# Patient Record
Sex: FEMALE | Race: WHITE | NOT HISPANIC OR LATINO | Employment: FULL TIME | URBAN - NONMETROPOLITAN AREA
[De-identification: names, ages, dates, MRNs, and addresses within clinical notes are randomized per-mention and may not be internally consistent; named-entity substitution may affect disease eponyms.]

---

## 2018-02-09 ENCOUNTER — TELEPHONE (OUTPATIENT)
Dept: URGENT CARE | Facility: CLINIC | Age: 38
End: 2018-02-09

## 2018-02-09 DIAGNOSIS — B95.4 STREPTOCOCCAL INFECTION GROUP G: Primary | ICD-10-CM

## 2018-02-09 RX ORDER — AZITHROMYCIN 250 MG/1
TABLET, FILM COATED ORAL
Qty: 6 TABLET | Refills: 0 | Status: SHIPPED | OUTPATIENT
Start: 2018-02-09 | End: 2018-05-03

## 2020-04-06 ENCOUNTER — HOSPITAL ENCOUNTER (EMERGENCY)
Facility: HOSPITAL | Age: 40
Discharge: HOME | End: 2020-04-06
Attending: EMERGENCY MEDICINE | Admitting: EMERGENCY MEDICINE

## 2020-04-06 ENCOUNTER — APPOINTMENT (OUTPATIENT)
Dept: GENERAL RADIOLOGY | Facility: HOSPITAL | Age: 40
End: 2020-04-06

## 2020-04-06 VITALS
OXYGEN SATURATION: 95 % | DIASTOLIC BLOOD PRESSURE: 76 MMHG | HEIGHT: 62 IN | WEIGHT: 181 LBS | RESPIRATION RATE: 19 BRPM | SYSTOLIC BLOOD PRESSURE: 123 MMHG | HEART RATE: 74 BPM | BODY MASS INDEX: 33.31 KG/M2 | TEMPERATURE: 98.4 F
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
## 2020-04-06 PROCEDURE — 25010000003 LIDOCAINE 1 % SOLUTION: Performed by: PHYSICIAN ASSISTANT

## 2020-04-06 PROCEDURE — 99283 EMERGENCY DEPT VISIT LOW MDM: CPT

## 2020-04-06 PROCEDURE — 25010000002 TDAP 5-2.5-18.5 LF-MCG/0.5 SUSPENSION: Performed by: PHYSICIAN ASSISTANT

## 2020-04-06 PROCEDURE — 73090 X-RAY EXAM OF FOREARM: CPT

## 2020-04-06 PROCEDURE — 90715 TDAP VACCINE 7 YRS/> IM: CPT | Performed by: PHYSICIAN ASSISTANT

## 2020-04-06 PROCEDURE — 90471 IMMUNIZATION ADMIN: CPT | Performed by: PHYSICIAN ASSISTANT

## 2020-04-06 RX ORDER — DOXYCYCLINE 100 MG/1
100 CAPSULE ORAL 2 TIMES DAILY
Qty: 28 CAPSULE | Refills: 0 | Status: SHIPPED | OUTPATIENT
Start: 2020-04-06 | End: 2020-04-20

## 2020-04-06 RX ORDER — LIDOCAINE HYDROCHLORIDE 10 MG/ML
10 INJECTION, SOLUTION INFILTRATION; PERINEURAL ONCE
Status: COMPLETED | OUTPATIENT
Start: 2020-04-06 | End: 2020-04-06

## 2020-04-06 RX ADMIN — TETANUS TOXOID, REDUCED DIPHTHERIA TOXOID AND ACELLULAR PERTUSSIS VACCINE, ADSORBED 0.5 ML: 5; 2.5; 8; 8; 2.5 SUSPENSION INTRAMUSCULAR at 20:15

## 2020-04-06 RX ADMIN — LIDOCAINE HYDROCHLORIDE 10 ML: 10 INJECTION, SOLUTION INFILTRATION; PERINEURAL at 20:16

## 2020-04-06 NOTE — ED PROVIDER NOTES
"Subjective   This patient states just prior to arrival she was outdoors and neighbors were walking their dog and the dog bit her on the right volar forearm while she was petting it.  The dog's elderly although healthy-appearing.  Patient unsure when her last tetanus shot was but thinks it was longer than 5 years ago.  Bleeding controlled without any intervention upon arrival.  Patient has good strength in flexion of all digits of the right hand and right wrist against resistance without evidence of tendon injury.  She denies foreign body sensation.          Review of Systems   Constitutional: Negative.    HENT: Negative.    Eyes: Negative.    Respiratory: Negative.    Cardiovascular: Negative.    Gastrointestinal: Negative.    Genitourinary: Negative.    Musculoskeletal: Negative.    Skin:        Puncture wound to the right volar forearm/wrist   Neurological: Negative.    Psychiatric/Behavioral: Negative.        Past Medical History:   Diagnosis Date   • ADHD    • Disease of thyroid gland    • Insomnia    • Low blood sugar    • PCOS (polycystic ovarian syndrome)        Allergies   Allergen Reactions   • Sulfa Antibiotics Nausea Only and GI Intolerance   • Penicillins Rash     Patient states, taken as a child. Parent stated it gave a rash and turned her blue.\"   • Tamiflu [Oseltamivir Phosphate] Rash       Past Surgical History:   Procedure Laterality Date   • DENTAL PROCEDURE         Family History   Problem Relation Age of Onset   • No Known Problems Mother    • No Known Problems Father        Social History     Socioeconomic History   • Marital status:      Spouse name: Not on file   • Number of children: Not on file   • Years of education: Not on file   • Highest education level: Not on file   Tobacco Use   • Smoking status: Never Smoker   • Smokeless tobacco: Never Used           Objective   Physical Exam   Constitutional: She appears well-developed and well-nourished.   HENT:   Head: Normocephalic and " atraumatic.   Eyes: EOM are normal.   Neck: Normal range of motion.   Cardiovascular: Normal rate and regular rhythm.   Pulmonary/Chest: Effort normal.   Musculoskeletal: Normal range of motion.   Good strength in flexion against resistance of the DIPs of digits 2 through 5, PIPs of digits 2 through 5, MCPs of digits 2 through 5, and wrist.   Neurological: She is alert.   Skin: Skin is warm and dry.   2 cm puncture wound to the distal volar right forearm midline.  No visualized tendon or muscle belly.  No visualized foreign body.  No bleeding.  2+ radial pulse.   Psychiatric: She has a normal mood and affect. Her behavior is normal. Thought content normal.   Nursing note and vitals reviewed.      Laceration Repair  Date/Time: 4/6/2020 8:12 PM  Performed by: Garrett Amador PA-C  Authorized by: Link Cruz MD     Consent:     Consent obtained:  Verbal    Consent given by:  Patient    Risks discussed:  Infection and pain    Alternatives discussed:  No treatment  Anesthesia (see MAR for exact dosages):     Anesthesia method:  Local infiltration    Local anesthetic:  Lidocaine 1% w/o epi  Laceration details:     Location:  Shoulder/arm    Shoulder/arm location:  R lower arm    Length (cm):  2  Repair type:     Repair type:  Simple  Pre-procedure details:     Preparation:  Patient was prepped and draped in usual sterile fashion  Exploration:     Wound extent: no muscle damage noted, no tendon damage noted, no underlying fracture noted and no vascular damage noted    Treatment:     Area cleansed with:  Hibiclens    Amount of cleaning:  Extensive    Irrigation solution:  Sterile water    Irrigation method:  Pressure wash  Skin repair:     Repair method:  Sutures    Suture size:  4-0    Suture material:  Nylon    Suture technique:  Simple interrupted    Number of sutures:  2  Approximation:     Approximation:  Loose  Post-procedure details:     Dressing:  Non-adherent dressing    Patient tolerance of  procedure:  Tolerated well, no immediate complications               ED Course                                           MDM    Final diagnoses:   Dog bite, initial encounter            Garrett Amador PA-C  04/06/20 2018

## 2020-08-26 ENCOUNTER — TRANSCRIBE ORDERS (OUTPATIENT)
Dept: URGENT CARE | Facility: CLINIC | Age: 40
End: 2020-08-26

## 2020-11-03 PROCEDURE — U0004 COV-19 TEST NON-CDC HGH THRU: HCPCS | Performed by: PHYSICIAN ASSISTANT

## 2021-03-03 ENCOUNTER — APPOINTMENT (OUTPATIENT)
Dept: GENERAL RADIOLOGY | Facility: HOSPITAL | Age: 41
End: 2021-03-03

## 2021-03-03 ENCOUNTER — HOSPITAL ENCOUNTER (EMERGENCY)
Facility: HOSPITAL | Age: 41
Discharge: HOME | End: 2021-03-03
Attending: EMERGENCY MEDICINE | Admitting: EMERGENCY MEDICINE

## 2021-03-03 VITALS
WEIGHT: 186 LBS | HEART RATE: 74 BPM | OXYGEN SATURATION: 100 % | RESPIRATION RATE: 16 BRPM | BODY MASS INDEX: 34.23 KG/M2 | TEMPERATURE: 98.7 F | HEIGHT: 62 IN | DIASTOLIC BLOOD PRESSURE: 76 MMHG | SYSTOLIC BLOOD PRESSURE: 118 MMHG

## 2021-03-03 DIAGNOSIS — R00.2 PALPITATIONS: Primary | ICD-10-CM

## 2021-03-03 LAB
ALBUMIN SERPL-MCNC: 3.8 G/DL (ref 3.5–5.2)
ALBUMIN/GLOB SERPL: 1.4 G/DL
ALP SERPL-CCNC: 59 U/L (ref 39–117)
ALT SERPL W P-5'-P-CCNC: 16 U/L (ref 1–33)
ANION GAP SERPL CALCULATED.3IONS-SCNC: 8.9 MMOL/L (ref 5–15)
AST SERPL-CCNC: 16 U/L (ref 1–32)
BASOPHILS # BLD AUTO: 0.04 10*3/MM3 (ref 0–0.2)
BASOPHILS NFR BLD AUTO: 0.5 % (ref 0–1.5)
BILIRUB SERPL-MCNC: 0.2 MG/DL (ref 0–1.2)
BUN SERPL-MCNC: 11 MG/DL (ref 6–20)
BUN/CREAT SERPL: 11.2 (ref 7–25)
CALCIUM SPEC-SCNC: 8.4 MG/DL (ref 8.6–10.5)
CHLORIDE SERPL-SCNC: 102 MMOL/L (ref 98–107)
CO2 SERPL-SCNC: 26.1 MMOL/L (ref 22–29)
CREAT SERPL-MCNC: 0.98 MG/DL (ref 0.57–1)
DEPRECATED RDW RBC AUTO: 40.4 FL (ref 37–54)
EOSINOPHIL # BLD AUTO: 0.06 10*3/MM3 (ref 0–0.4)
EOSINOPHIL NFR BLD AUTO: 0.8 % (ref 0.3–6.2)
ERYTHROCYTE [DISTWIDTH] IN BLOOD BY AUTOMATED COUNT: 12 % (ref 12.3–15.4)
GFR SERPL CREATININE-BSD FRML MDRD: 64 ML/MIN/1.73
GLOBULIN UR ELPH-MCNC: 2.7 GM/DL
GLUCOSE SERPL-MCNC: 92 MG/DL (ref 65–99)
HCT VFR BLD AUTO: 39.6 % (ref 34–46.6)
HGB BLD-MCNC: 13.2 G/DL (ref 12–15.9)
HOLD SPECIMEN: NORMAL
HOLD SPECIMEN: NORMAL
IMM GRANULOCYTES # BLD AUTO: 0.02 10*3/MM3 (ref 0–0.05)
IMM GRANULOCYTES NFR BLD AUTO: 0.3 % (ref 0–0.5)
LYMPHOCYTES # BLD AUTO: 2.27 10*3/MM3 (ref 0.7–3.1)
LYMPHOCYTES NFR BLD AUTO: 28.6 % (ref 19.6–45.3)
MAGNESIUM SERPL-MCNC: 1.8 MG/DL (ref 1.6–2.6)
MCH RBC QN AUTO: 30.5 PG (ref 26.6–33)
MCHC RBC AUTO-ENTMCNC: 33.3 G/DL (ref 31.5–35.7)
MCV RBC AUTO: 91.5 FL (ref 79–97)
MONOCYTES # BLD AUTO: 0.66 10*3/MM3 (ref 0.1–0.9)
MONOCYTES NFR BLD AUTO: 8.3 % (ref 5–12)
NEUTROPHILS NFR BLD AUTO: 4.89 10*3/MM3 (ref 1.7–7)
NEUTROPHILS NFR BLD AUTO: 61.5 % (ref 42.7–76)
NRBC BLD AUTO-RTO: 0 /100 WBC (ref 0–0.2)
PLATELET # BLD AUTO: 337 10*3/MM3 (ref 140–450)
PMV BLD AUTO: 9.2 FL (ref 6–12)
POTASSIUM SERPL-SCNC: 3.6 MMOL/L (ref 3.5–5.2)
PROT SERPL-MCNC: 6.5 G/DL (ref 6–8.5)
RBC # BLD AUTO: 4.33 10*6/MM3 (ref 3.77–5.28)
SODIUM SERPL-SCNC: 137 MMOL/L (ref 136–145)
TROPONIN T SERPL-MCNC: <0.01 NG/ML (ref 0–0.03)
TSH SERPL DL<=0.05 MIU/L-ACNC: 1.65 UIU/ML (ref 0.27–4.2)
WBC # BLD AUTO: 7.94 10*3/MM3 (ref 3.4–10.8)
WHOLE BLOOD HOLD SPECIMEN: NORMAL
WHOLE BLOOD HOLD SPECIMEN: NORMAL

## 2021-03-03 PROCEDURE — 84484 ASSAY OF TROPONIN QUANT: CPT

## 2021-03-03 PROCEDURE — 80053 COMPREHEN METABOLIC PANEL: CPT

## 2021-03-03 PROCEDURE — 84443 ASSAY THYROID STIM HORMONE: CPT | Performed by: PHYSICIAN ASSISTANT

## 2021-03-03 PROCEDURE — 99284 EMERGENCY DEPT VISIT MOD MDM: CPT

## 2021-03-03 PROCEDURE — 83735 ASSAY OF MAGNESIUM: CPT

## 2021-03-03 PROCEDURE — 93005 ELECTROCARDIOGRAM TRACING: CPT

## 2021-03-03 PROCEDURE — 85025 COMPLETE CBC W/AUTO DIFF WBC: CPT

## 2021-03-03 PROCEDURE — 71045 X-RAY EXAM CHEST 1 VIEW: CPT

## 2021-03-03 RX ORDER — SODIUM CHLORIDE 0.9 % (FLUSH) 0.9 %
10 SYRINGE (ML) INJECTION AS NEEDED
Status: DISCONTINUED | OUTPATIENT
Start: 2021-03-03 | End: 2021-03-03 | Stop reason: HOSPADM

## 2021-03-03 NOTE — DISCHARGE INSTRUCTIONS
Take all of your home medications as directed.  You will likely need further evaluation cardiology and possible Holter monitor.  Follow-up with your PCP as early as possible to discuss the symptoms.  You can call our cardiologist Dr. Kirk for earliest available appointment as well.  Return to the ER for any change, worsening symptoms, or any additional concerns including but not limited to severe persistent palpitations, severe dizziness, syncope, hemoptysis, shortness of breath or chest pain.

## 2021-03-03 NOTE — ED PROVIDER NOTES
"Subjective   Patient is a 40-year-old female with a history of ADHD, hypothyroidism, PCOS and insomnia presenting to the ER for evaluation palpitations.  Patient states that recently she has had episodes where her heart seems to feel as if it is racing, states that symptoms her electronic device will stay and is in the 120s.  She states that it seems to just \"go up and down\".  She states that she got her second Covid vaccine on January 20, 2021.  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  She states she symptoms feels dizzy, denies any significant shortness of breath. She states she does not drink much caffeine.  She denies any fever, chills, syncope, headache, chest pain, hemoptysis, abdominal pain, nausea, emesis, diarrhea, leg pain or swelling, or any other symptoms.  She denies any known underlying cardiovascular or pulmonary disease.          Review of Systems   Constitutional: Negative for chills and fever.   HENT: Negative.    Eyes: Negative.    Respiratory: Negative.    Cardiovascular: Positive for palpitations. Negative for leg swelling.   Gastrointestinal: Negative.    Genitourinary: Negative.    Musculoskeletal: Negative.    Skin: Negative.    Allergic/Immunologic: Negative for immunocompromised state.   Neurological: Positive for light-headedness. Negative for dizziness and syncope.   Psychiatric/Behavioral: Negative.        Past Medical History:   Diagnosis Date   • ADHD    • Disease of thyroid gland    • Insomnia    • Low blood sugar    • PCOS (polycystic ovarian syndrome)        Allergies   Allergen Reactions   • Sulfa Antibiotics Nausea Only and GI Intolerance   • Penicillins Rash     Patient states, taken as a child. Parent stated it gave a rash and turned her blue.\"   • Tamiflu [Oseltamivir Phosphate] Rash       Past Surgical History:   Procedure Laterality Date   • DENTAL PROCEDURE         Family History   Problem Relation Age of Onset   • No " "Known Problems Mother    • No Known Problems Father        Social History     Socioeconomic History   • Marital status:      Spouse name: Not on file   • Number of children: Not on file   • Years of education: Not on file   • Highest education level: Not on file   Tobacco Use   • Smoking status: Never Smoker   • Smokeless tobacco: Never Used   Substance and Sexual Activity   • Alcohol use: Yes     Comment: rarely           Objective   Physical Exam  Vitals signs and nursing note reviewed.     /76   Pulse 74   Temp 98.7 °F (37.1 °C) (Oral)   Resp 16   Ht 157.5 cm (62\")   Wt 84.4 kg (186 lb)   LMP 07/16/2020 (Approximate)   SpO2 100%   BMI 34.02 kg/m²     GEN: No acute distress, sitting up in the stretcher.  She is awake and alert.  She does not appear toxic.  Head: Normocephalic, atraumatic  Eyes: EOM intact  ENT: Mask in place per protocol  Cardiovascular: Regular rate and rhythm   Lungs: Clear to auscultation bilaterally without adventitious soiudns  Abdomen: Soft, nontender, nondistended, no peritoneal sounds  Extremities: No edema, normal appearance, full ROM. No calf tenderness  Neuro: GCS 15  Psych: Mood and affect are appropriate    Procedures           ED Course  ED Course as of Mar 03 1717   Wed Mar 03, 2021   1555 PROCEDURE: XR CHEST 1 VW-     HISTORY: Dysrhythmia Triage Protocol , arrhythmia     COMPARISON:  None     FINDINGS:  Portable view of the chest demonstrates the lungs to be  grossly clear. There is no evidence of effusion, pneumothorax or other  significant pleural disease. The mediastinum is unremarkable.     The heart size is normal.     IMPRESSION:  Unremarkable portable chest.      This report was finalized on 3/3/2021 3:06 PM by Juan Mera MD.    [LA]   1555 Glucose: 92 [LA]   1555 BUN: 11 [LA]   1555 Creatinine: 0.98 [LA]   1555 Sodium: 137 [LA]   1555 Potassium: 3.6 [LA]   1555 Chloride: 102 [LA]   1555 CO2: 26.1 [LA]   1555 Calcium(!): 8.4 [LA]   1555 Total Protein: " 6.5 [LA]   1555 Albumin: 3.80 [LA]   1555 ALT (SGPT): 16 [LA]   1555 AST (SGOT): 16 [LA]   1555 Alkaline Phosphatase: 59 [LA]   1555 Total Bilirubin: 0.2 [LA]   1555 eGFR Non  Am: 64 [LA]   1555 Globulin: 2.7 [LA]   1555 A/G Ratio: 1.4 [LA]   1555 BUN/Creatinine Ratio: 11.2 [LA]   1555 Anion Gap: 8.9 [LA]   1555 Troponin T: <0.010 [LA]   1555 Magnesium: 1.8 [LA]   1555 WBC: 7.94 [LA]   1556 Hemoglobin: 13.2 [LA]   1556 Platelets: 337 [LA]   1556 TSH Baseline: 1.650 [LA]   1602 Discussed findings with the patient.  Discussed she is going to need very close follow-up with PCP and cardiology as well.  Discussed very strict return precautions.  She verbalized understanding and was in agreement with this plan of care.    [LA]      ED Course User Index  [LA] Chelsea Guerin PA-C                                           MDM  Number of Diagnoses or Management Options  Palpitations:   Diagnosis management comments: On arrival, patient stable.  She is normotensive, no tachycardia or hypoxia.  She does not appear septic or toxic.  She is afebrile.  Differential could include anxiety, cardiac dysrhythmia, ACS, dehydration, electrolyte abnormalities, hyperthyroidism, and other concerns.  She has no tachycardia or hypoxia concerning for pulmonary embolism.  Will obtain basic labs including magnesium, TSH level, troponin, EKG and chest x-ray.  Discussed with Dr. Crzu and he is in agreement with this plan of care. Discussed with the patient that she will most likely need to be followed up with cardiology for possible Holter monitor.     Lab work all stable without abnormalities.  EKG was interpreted by the attending.  Chest x-ray was reviewed by the radiologist with no acute findings.  Patient remained stable here, there were no episodes of tachycardia or hypoxia.  Believe she can be discharged with very close follow-up.  Discussed follow-up with her primary care provider, also give cardiology referral, she would likely  need a Holter monitor at the very least.  We discussed very strict return precautions to the ER.  She verbalized understanding and was in agreement with this plan of care.       Amount and/or Complexity of Data Reviewed  Clinical lab tests: reviewed and ordered  Tests in the radiology section of CPT®: reviewed and ordered  Review and summarize past medical records: yes  Discuss the patient with other providers: yes    Risk of Complications, Morbidity, and/or Mortality  Presenting problems: moderate  Diagnostic procedures: moderate  Management options: low    Patient Progress  Patient progress: stable      Final diagnoses:   Palpitations            Chelsea Guerin PA-C  03/03/21 1717

## 2021-05-25 ENCOUNTER — OFFICE VISIT (OUTPATIENT)
Dept: CARDIOLOGY | Facility: CLINIC | Age: 41
End: 2021-05-25

## 2021-05-25 VITALS
OXYGEN SATURATION: 98 % | HEIGHT: 62 IN | DIASTOLIC BLOOD PRESSURE: 72 MMHG | WEIGHT: 188 LBS | RESPIRATION RATE: 16 BRPM | BODY MASS INDEX: 34.6 KG/M2 | SYSTOLIC BLOOD PRESSURE: 110 MMHG | HEART RATE: 90 BPM

## 2021-05-25 DIAGNOSIS — R00.2 PALPITATIONS: Primary | ICD-10-CM

## 2021-05-25 PROCEDURE — 99244 OFF/OP CNSLTJ NEW/EST MOD 40: CPT | Performed by: INTERNAL MEDICINE

## 2021-05-25 PROCEDURE — 93000 ELECTROCARDIOGRAM COMPLETE: CPT | Performed by: INTERNAL MEDICINE

## 2021-05-25 NOTE — PROGRESS NOTES
"     [facility] Cardiology OP Consult Note    Anyi Meyer  [id number]  2021    Referred By: No ref. provider found    Chief Complaint: Palpitations    History of Present Illness:   Mrs. Anyi Meyer is a 40 y.o. female who presents to the Cardiology Clinic for evaluation of palpitations.  The patient has a past medical history significant for ADHD, PCOS, and hypothyroidism.  She does not have any significant past cardiac history.  She has a history of presentation to the emergency department in 3/21 for evaluation of palpitations.  At that time, an ECG was unremarkable, and a TSH was found to be within normal limits.  She was subsequently discharged home with instructions to follow-up with Cardiology as an outpatient.  The patient reports that at the time of her presentation the emergency department, she experienced palpitations described as a \"racing heart rate\" with her Fitbit watch reporting mild tachycardia with a ventricular rate in the 120s.  She denies any associated dizziness, lightheadedness, or presyncopal symptoms.  No history of syncope.  Since that time, she has not had any recurrent sustained episodes.  The patient reports she is active, and is started exercising for weight loss and denies exertional chest pain or chest discomfort.  No orthopnea, PND, or significant lower extremity swelling.  No other specific complaints at this time.    Past Cardiac Testin. Last Coronary Angio: None  2. Prior Stress Testing: None  3. Last Echo: None  4. Prior Holter Monitor: None    Review of Systems:   Review of Systems   Constitutional: Negative for activity change, appetite change, chills, diaphoresis, fatigue, fever, unexpected weight gain and unexpected weight loss.   Eyes: Negative for blurred vision and double vision.   Respiratory: Negative for cough, chest tightness, shortness of breath and wheezing.    Cardiovascular: Positive for palpitations. Negative for chest pain " and leg swelling.   Gastrointestinal: Negative for abdominal pain, anal bleeding, blood in stool and GERD.   Endocrine: Negative for cold intolerance and heat intolerance.   Genitourinary: Negative for hematuria.   Neurological: Negative for dizziness, syncope, weakness and light-headedness.   Hematological: Does not bruise/bleed easily.   Psychiatric/Behavioral: Negative for depressed mood and stress. The patient is not nervous/anxious.        Past Medical History:   Past Medical History:   Diagnosis Date   • ADHD    • Disease of thyroid gland    • Insomnia    • Low blood sugar    • PCOS (polycystic ovarian syndrome)        Past Surgical History:   Past Surgical History:   Procedure Laterality Date   • DENTAL PROCEDURE         Family History:   Family History   Problem Relation Age of Onset   • Heart attack Mother    • Stroke Mother    • Mental illness Mother    • Kidney disease Mother    • Hyperlipidemia Mother    • Bradycardia Father    • Obesity Father        Social History:   Social History     Socioeconomic History   • Marital status:      Spouse name: Not on file   • Number of children: Not on file   • Years of education: Not on file   • Highest education level: Not on file   Tobacco Use   • Smoking status: Former Smoker     Types: Cigarettes     Quit date:      Years since quittin.4   • Smokeless tobacco: Never Used   Vaping Use   • Vaping Use: Never used   Substance and Sexual Activity   • Alcohol use: Yes     Comment: rarely       Medications:     Current Outpatient Medications:   •  amphetamine-dextroamphetamine XR (ADDERALL XR) 10 MG 24 hr capsule, Take 5 mg by mouth Every Morning  , Disp: , Rfl:   •  amphetamine-dextroamphetamine XR (ADDERALL XR) 30 MG 24 hr capsule, TK 1 C PO ONCE D IN THE MORNING, Disp: , Rfl: 0  •  ascorbic acid (VITAMIN C) 1000 MG tablet, Take 1,000 mg by mouth Daily., Disp: , Rfl:   •  azelastine (ASTELIN) 0.1 % nasal spray, 2 sprays into each nostril 2 (Two) Times  "a Day. Use in each nostril as directed, Disp: , Rfl:   •  BIOTIN PO, Take  by mouth., Disp: , Rfl:   •  drospirenone-ethinyl estradiol (LAYNE 28) 3-0.03 MG per tablet, Take 1 tablet by mouth Daily., Disp: , Rfl:   •  levothyroxine (SYNTHROID, LEVOTHROID) 50 MCG tablet, Take  by mouth Daily., Disp: , Rfl: 5  •  Magnesium 100 MG tablet, Take  by mouth., Disp: , Rfl:   •  melatonin 5 MG tablet tablet, Take 5 mg by mouth., Disp: , Rfl:   •  Multiple Vitamins-Minerals (MULTIVITAL PO), Take  by mouth., Disp: , Rfl:   •  traZODone (DESYREL) 100 MG tablet, Take 100 mg by mouth Every Night., Disp: , Rfl:   •  VITAMIN D PO, Take  by mouth., Disp: , Rfl:     Allergies:   Allergies   Allergen Reactions   • Sulfa Antibiotics Nausea Only and GI Intolerance   • Penicillins Rash     Patient states, taken as a child. Parent stated it gave a rash and turned her blue.\"   • Tamiflu [Oseltamivir Phosphate] Rash       Physical Exam:  Vital Signs:   Vitals:    05/25/21 1029 05/25/21 1032   BP: 108/72 110/72   BP Location: Right arm Left arm   Pulse: 90    Resp: 16    SpO2: 98%    Weight: 85.3 kg (188 lb)    Height: 157.5 cm (62\")        Physical Exam  Constitutional:       General: She is not in acute distress.     Appearance: She is well-developed. She is obese. She is not diaphoretic.   HENT:      Head: Normocephalic and atraumatic.   Eyes:      General: No scleral icterus.     Pupils: Pupils are equal, round, and reactive to light.   Neck:      Trachea: No tracheal deviation.   Cardiovascular:      Rate and Rhythm: Normal rate and regular rhythm.      Heart sounds: Normal heart sounds. No murmur heard.   No friction rub. No gallop.       Comments: Normal JVD.  Pulmonary:      Effort: Pulmonary effort is normal. No respiratory distress.      Breath sounds: Normal breath sounds. No stridor. No wheezing or rales.   Chest:      Chest wall: No tenderness.   Abdominal:      General: Bowel sounds are normal. There is no distension.      " Palpations: Abdomen is soft.      Tenderness: There is no abdominal tenderness. There is no guarding or rebound.   Musculoskeletal:         General: No swelling. Normal range of motion.      Cervical back: Neck supple. No tenderness.   Lymphadenopathy:      Cervical: No cervical adenopathy.   Skin:     General: Skin is warm and dry.      Findings: No erythema.   Neurological:      General: No focal deficit present.      Mental Status: She is alert and oriented to person, place, and time.   Psychiatric:         Mood and Affect: Mood normal.         Behavior: Behavior normal.         Results Review:   I reviewed the patient's new clinical results.  I personally viewed and interpreted the patient's EKG/Telemetry data      ECG 12 Lead    Date/Time: 5/25/2021 1:47 PM  Performed by: Abebe Kirk MD  Authorized by: Abebe Kirk MD   Comparison: not compared with previous ECG   Rhythm: sinus rhythm and sinus arrhythmia  Rate: normal  QRS axis: normal    Clinical impression: normal ECG            Assessment / Plan:     1. Palpitations  --Presents today for evaluation of an isolated episode of palpitations, of unclear etiology  --Upon evaluation emergency department, normal ECG as well as unremarkable laboratory work-up  --Repeat ECG today continues to show NSR without significant conduction system abnormalities  --Recent TSH was within normal limits  --Will proceed with outpatient cardiac monitoring to rule out tachyarrhythmias, evaluate for ectopy   --Echocardiogram to rule underlying structural valvular abnormalities  --Follow-up in 6 months, or sooner if required pending results of cardiac testing        Follow Up:   Return in about 6 months (around 11/25/2021).      Thank you for allowing me to participate in the care of your patient. Please to not hesitate to contact me with additional questions or concerns.     LUZMARIA Kirk MD  Interventional Cardiology   05/25/2021  10:21 EDT

## 2021-11-08 PROCEDURE — U0004 COV-19 TEST NON-CDC HGH THRU: HCPCS | Performed by: NURSE PRACTITIONER

## 2024-03-18 ENCOUNTER — PATIENT ROUNDING (BHMG ONLY) (OUTPATIENT)
Dept: URGENT CARE | Facility: CLINIC | Age: 44
End: 2024-03-18
Payer: COMMERCIAL